# Patient Record
Sex: FEMALE | Race: WHITE | Employment: FULL TIME | URBAN - METROPOLITAN AREA
[De-identification: names, ages, dates, MRNs, and addresses within clinical notes are randomized per-mention and may not be internally consistent; named-entity substitution may affect disease eponyms.]

---

## 2021-07-14 ENCOUNTER — APPOINTMENT (OUTPATIENT)
Dept: CT IMAGING | Age: 21
End: 2021-07-14
Payer: COMMERCIAL

## 2021-07-14 ENCOUNTER — HOSPITAL ENCOUNTER (EMERGENCY)
Age: 21
Discharge: HOME | End: 2021-07-15
Attending: EMERGENCY MEDICINE
Payer: COMMERCIAL

## 2021-07-14 DIAGNOSIS — R51.9 ACUTE NONINTRACTABLE HEADACHE, UNSPECIFIED HEADACHE TYPE: Primary | ICD-10-CM

## 2021-07-14 LAB
A/G RATIO: 1.4 (ref 1.1–2.2)
ALBUMIN SERPL-MCNC: 4 G/DL (ref 3.4–5)
ALP BLD-CCNC: 47 U/L (ref 40–129)
ALT SERPL-CCNC: 8 U/L (ref 10–40)
ANION GAP SERPL CALCULATED.3IONS-SCNC: 12 MMOL/L (ref 3–16)
APPEARANCE CSF: CLEAR
APPEARANCE CSF: CLEAR
AST SERPL-CCNC: 18 U/L (ref 15–37)
BASOPHILS ABSOLUTE: 0.1 K/UL (ref 0–0.2)
BASOPHILS RELATIVE PERCENT: 0.7 %
BILIRUB SERPL-MCNC: <0.2 MG/DL (ref 0–1)
BUN BLDV-MCNC: 13 MG/DL (ref 7–20)
CALCIUM SERPL-MCNC: 9.1 MG/DL (ref 8.3–10.6)
CHLORIDE BLD-SCNC: 106 MMOL/L (ref 99–110)
CLOT EVALUATION CSF: ABNORMAL
CLOT EVALUATION CSF: NORMAL
CO2: 20 MMOL/L (ref 21–32)
COLOR CSF: COLORLESS
COLOR CSF: COLORLESS
CREAT SERPL-MCNC: 0.8 MG/DL (ref 0.6–1.1)
EOSINOPHILS ABSOLUTE: 0.1 K/UL (ref 0–0.6)
EOSINOPHILS RELATIVE PERCENT: 1.7 %
GFR AFRICAN AMERICAN: >60
GFR NON-AFRICAN AMERICAN: >60
GLOBULIN: 2.8 G/DL
GLUCOSE BLD-MCNC: 117 MG/DL (ref 70–99)
GLUCOSE, CSF: 68 MG/DL (ref 40–80)
HCT VFR BLD CALC: 33.6 % (ref 36–48)
HEMOGLOBIN: 11.3 G/DL (ref 12–16)
LYMPHOCYTES ABSOLUTE: 1.3 K/UL (ref 1–5.1)
LYMPHOCYTES RELATIVE PERCENT: 15.4 %
MCH RBC QN AUTO: 28.6 PG (ref 26–34)
MCHC RBC AUTO-ENTMCNC: 33.8 G/DL (ref 31–36)
MCV RBC AUTO: 84.8 FL (ref 80–100)
MONOCYTES ABSOLUTE: 0.8 K/UL (ref 0–1.3)
MONOCYTES RELATIVE PERCENT: 9.2 %
NEUTROPHILS ABSOLUTE: 6 K/UL (ref 1.7–7.7)
NEUTROPHILS RELATIVE PERCENT: 73 %
NO DIFFERENTIAL CSF: ABNORMAL
NO DIFFERENTIAL CSF: NORMAL
PDW BLD-RTO: 15.4 % (ref 12.4–15.4)
PLATELET # BLD: 278 K/UL (ref 135–450)
PMV BLD AUTO: 8.5 FL (ref 5–10.5)
POTASSIUM REFLEX MAGNESIUM: 4.3 MMOL/L (ref 3.5–5.1)
PROTEIN CSF: 23 MG/DL (ref 15–45)
RBC # BLD: 3.96 M/UL (ref 4–5.2)
RBC CSF: 0 /CUMM
RBC CSF: 3 /CUMM
S PYO AG THROAT QL: NEGATIVE
SODIUM BLD-SCNC: 138 MMOL/L (ref 136–145)
TOTAL PROTEIN: 6.8 G/DL (ref 6.4–8.2)
TUBE NUMBER CSF: ABNORMAL
TUBE NUMBER CSF: NORMAL
WBC # BLD: 8.2 K/UL (ref 4–11)
WBC CSF: 0 /CUMM (ref 0–5)
WBC CSF: 0 /CUMM (ref 0–5)

## 2021-07-14 PROCEDURE — 89050 BODY FLUID CELL COUNT: CPT

## 2021-07-14 PROCEDURE — 87205 SMEAR GRAM STAIN: CPT

## 2021-07-14 PROCEDURE — 87081 CULTURE SCREEN ONLY: CPT

## 2021-07-14 PROCEDURE — 88112 CYTOPATH CELL ENHANCE TECH: CPT

## 2021-07-14 PROCEDURE — 70450 CT HEAD/BRAIN W/O DYE: CPT

## 2021-07-14 PROCEDURE — 80053 COMPREHEN METABOLIC PANEL: CPT

## 2021-07-14 PROCEDURE — 2580000003 HC RX 258: Performed by: EMERGENCY MEDICINE

## 2021-07-14 PROCEDURE — 96375 TX/PRO/DX INJ NEW DRUG ADDON: CPT

## 2021-07-14 PROCEDURE — 82945 GLUCOSE OTHER FLUID: CPT

## 2021-07-14 PROCEDURE — 62270 DX LMBR SPI PNXR: CPT

## 2021-07-14 PROCEDURE — 87070 CULTURE OTHR SPECIMN AEROBIC: CPT

## 2021-07-14 PROCEDURE — 6360000002 HC RX W HCPCS: Performed by: EMERGENCY MEDICINE

## 2021-07-14 PROCEDURE — 85025 COMPLETE CBC W/AUTO DIFF WBC: CPT

## 2021-07-14 PROCEDURE — 87529 HSV DNA AMP PROBE: CPT

## 2021-07-14 PROCEDURE — 99284 EMERGENCY DEPT VISIT MOD MDM: CPT

## 2021-07-14 PROCEDURE — 84157 ASSAY OF PROTEIN OTHER: CPT

## 2021-07-14 PROCEDURE — 87880 STREP A ASSAY W/OPTIC: CPT

## 2021-07-14 PROCEDURE — 96365 THER/PROPH/DIAG IV INF INIT: CPT

## 2021-07-14 RX ORDER — KETOROLAC TROMETHAMINE 30 MG/ML
30 INJECTION, SOLUTION INTRAMUSCULAR; INTRAVENOUS ONCE
Status: COMPLETED | OUTPATIENT
Start: 2021-07-14 | End: 2021-07-14

## 2021-07-14 RX ORDER — SODIUM CHLORIDE 0.9 % (FLUSH) 0.9 %
5-40 SYRINGE (ML) INJECTION PRN
Status: DISCONTINUED | OUTPATIENT
Start: 2021-07-14 | End: 2021-07-15 | Stop reason: HOSPADM

## 2021-07-14 RX ORDER — SODIUM CHLORIDE 0.9 % (FLUSH) 0.9 %
5-40 SYRINGE (ML) INJECTION EVERY 12 HOURS SCHEDULED
Status: DISCONTINUED | OUTPATIENT
Start: 2021-07-14 | End: 2021-07-15 | Stop reason: HOSPADM

## 2021-07-14 RX ORDER — SODIUM CHLORIDE 9 MG/ML
25 INJECTION, SOLUTION INTRAVENOUS PRN
Status: DISCONTINUED | OUTPATIENT
Start: 2021-07-14 | End: 2021-07-15 | Stop reason: HOSPADM

## 2021-07-14 RX ADMIN — KETOROLAC TROMETHAMINE 30 MG: 30 INJECTION, SOLUTION INTRAMUSCULAR; INTRAVENOUS at 22:11

## 2021-07-14 RX ADMIN — CEFTRIAXONE SODIUM 1000 MG: 1 INJECTION, POWDER, FOR SOLUTION INTRAMUSCULAR; INTRAVENOUS at 23:09

## 2021-07-14 ASSESSMENT — ENCOUNTER SYMPTOMS
NAUSEA: 1
SORE THROAT: 1
PHOTOPHOBIA: 0
COUGH: 0

## 2021-07-14 ASSESSMENT — PAIN SCALES - GENERAL
PAINLEVEL_OUTOF10: 7
PAINLEVEL_OUTOF10: 3
PAINLEVEL_OUTOF10: 9

## 2021-07-15 VITALS
HEIGHT: 67 IN | BODY MASS INDEX: 22.76 KG/M2 | TEMPERATURE: 98.7 F | DIASTOLIC BLOOD PRESSURE: 57 MMHG | RESPIRATION RATE: 18 BRPM | OXYGEN SATURATION: 96 % | SYSTOLIC BLOOD PRESSURE: 110 MMHG | HEART RATE: 72 BPM | WEIGHT: 145 LBS

## 2021-07-15 NOTE — ED NOTES
Dr. Kaitlin Andrews at bedside informing patient and mother about lumbar puncture. Consent signed at this time.       Joshua Jim RN  07/14/21 8822

## 2021-07-15 NOTE — ED NOTES
Dr. Devorah Mcarthur at bedside assessing pt and discussing plan of care     Barbara Morejon RN  07/14/21 0212

## 2021-07-15 NOTE — ED PROVIDER NOTES
[address] [facility]  ED  EMERGENCY DEPARTMENT ENCOUNTER      Pt Name: Keon Hamm  MRN: [id number]  [place] 2000  Date of evaluation: 7/14/2021  Provider: Jim Hernandez MD    CHIEF COMPLAINT       Chief Complaint   Patient presents with    Headache     starting around 31 75 62 tonight with nausea and neck pain. denies any vision changes         HISTORY OF PRESENT ILLNESS   (Location/Symptom, Timing/Onset,Context/Setting, Quality, Duration, Modifying Factors, Severity)  Note limiting factors. Keon Hamm is a 24 y.o. female who presents to the emergency department for headache and neck stiffness and neck pain. The patient states that over the past 3 days she has had mild headache. She has tried taking ibuprofen for this. Tonight however headache suddenly worsened and radiated to her neck. She is concerned for meningitis as the patient has a history of meningitis. 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 Ceftriaxone is recommended promptly if meningitis is ever suspected in this patient. She has no neurologic deficits. She has not had a fever. No recent illnesses. Of note her significant has been working with the homeless population. 3 days,  Progressively worsening    Nursing notes were reviewed. REVIEW OF SYSTEMS    (2-9 systems for level 4, 10 or more for level 5)     Review of Systems   Constitutional: Negative for fatigue and fever. HENT: Positive for sore throat. Eyes: Negative for photophobia and visual disturbance. Respiratory: Negative for cough. Cardiovascular: Negative for chest pain. Gastrointestinal: Positive for nausea. Endocrine: Negative for polyuria. Genitourinary: Negative for dysuria. Musculoskeletal: Positive for myalgias, neck pain and neck stiffness. Skin: Negative for rash. Neurological: Positive for headaches. Negative for speech difficulty, weakness and numbness. Hematological: Bruises/bleeds easily. Psychiatric/Behavioral: Negative for decreased concentration. PAST MEDICAL HISTORY   History reviewed. No pertinent past medical history. SURGICALHISTORY     History reviewed. No pertinent surgical history. CURRENT MEDICATIONS       There are no discharge medications for this patient. ALLERGIES     Patient has no known allergies. FAMILY HISTORY     History reviewed. No pertinent family history. SOCIAL HISTORY       Social History     Socioeconomic History    Marital status: Single     Spouse name: None    Number of children: None    Years of education: None    Highest education level: None   Occupational History    None   Tobacco Use    Smoking status: Never Smoker    Smokeless tobacco: Never Used   Substance and Sexual Activity    Alcohol use: Yes     Comment: occ    Drug use: Never    Sexual activity: None   Other Topics Concern    None   Social History Narrative    None     Social Determinants of Health     Financial Resource Strain:     Difficulty of Paying Living Expenses:    Food Insecurity:     Worried About Running Out of Food in the Last Year:     Ran Out of Food in the Last Year:    Transportation Needs:     Lack of Transportation (Medical):      Lack of Transportation (Non-Medical):    Physical Activity:     Days of Exercise per Week:     Minutes of Exercise per Session:    Stress:     Feeling of Stress :    Social Connections:     Frequency of Communication with Friends and Family:     Frequency of Social Gatherings with Friends and Family:     Attends Jainism Services:     Active Member of Clubs or Organizations:     Attends Club or Organization Meetings:     Marital Status:    Intimate Partner Violence:     Fear of Current or Ex-Partner:     Emotionally Abused:     Physically Abused:     Sexually Abused: SCREENINGS             PHYSICAL EXAM    (up to 7 for level 4, 8 or more for level 5)     ED Triage Vitals [07/14/21 2025]   BP Temp Temp Source Pulse Resp SpO2 Height Weight   130/71 98.7 °F (37.1 °C) Oral 97 18 94 % 5' 7\" (1.702 m) 145 lb (65.8 kg)       Physical Exam  Vitals and nursing note reviewed. Constitutional:       Appearance: Normal appearance. She is well-developed. She is not ill-appearing. HENT:      Head: Normocephalic and atraumatic. Right Ear: External ear normal.      Left Ear: External ear normal.      Nose: Nose normal.      Mouth/Throat:      Pharynx: Posterior oropharyngeal erythema present. Eyes:      General: No scleral icterus. Right eye: No discharge. Left eye: No discharge. Conjunctiva/sclera: Conjunctivae normal.   Cardiovascular:      Rate and Rhythm: Normal rate and regular rhythm. Heart sounds: Normal heart sounds. Pulmonary:      Effort: Pulmonary effort is normal. No respiratory distress. Breath sounds: Normal breath sounds. No wheezing or rales. Abdominal:      General: Bowel sounds are normal. There is no distension. Palpations: Abdomen is soft. Tenderness: There is no abdominal tenderness. There is no guarding or rebound. Musculoskeletal:      Cervical back: Rigidity present. Lymphadenopathy:      Cervical: No cervical adenopathy. Skin:     Coloration: Skin is not pale. Findings: No rash. Neurological:      Mental Status: She is alert.    Psychiatric:         Mood and Affect: Mood normal.         Behavior: Behavior normal.         DIAGNOSTIC RESULTS     EKG: All EKG's are interpreted by the Emergency Department Physician who either signs or Co-signs this chart in the absence of a cardiologist.    12 lead EKG shows     RADIOLOGY:   Non-plain film images such as CT, Ultrasound and MRI are read by the radiologist. Plain radiographic images are visualized and preliminarily interpreted by the emergency physician with the below findings:        Interpretation per the Radiologist below, if available at the time of this note:    CT Head WO Contrast   Final Result   No acute intracranial abnormality.                ED BEDSIDE ULTRASOUND:   Performed by ED Physician - none    LABS:  Labs Reviewed   CBC WITH AUTO DIFFERENTIAL - Abnormal; Notable for the following components:       Result Value    RBC 3.96 (*)     Hemoglobin 11.3 (*)     Hematocrit 33.6 (*)     All other components within normal limits    Narrative:     Performed at:  [address], [facility] [employer]   Phone [phone]   COMPREHENSIVE METABOLIC PANEL W/ REFLEX TO MG FOR LOW K - Abnormal; Notable for the following components:    CO2 20 (*)     Glucose 117 (*)     ALT 8 (*)     All other components within normal limits    Narrative:     Performed at:  [address], [facility] [employer]   Phone [phone]   CSF CELL COUNT WITH DIFFERENTIAL - Abnormal; Notable for the following components:    RBC, CSF 3 (*)     All other components within normal limits    Narrative:     Performed at:  [address], [facility] [employer]   Phone [phone] A THROAT    Narrative:     Performed at:  [address], [facility] [employer]   Phone [phone]   HSV PCR    Narrative:     Referred out by:  [address], [facility] [employer]   Phone [phone]   CULTURE, BETA STREP CONFIRM PLATES   CULTURE, CSF    Narrative:     ORDER#: [id number]                          ORDERED BY: Lissa Kennedy  SOURCE: CSF (Spinal Fluid)                 COLLECTED:  07/14/21 22:30  ANTIBIOTICS AT MIKY.:                      RECEIVED :  07/14/21 22:53  Performed at:  [address] [place] [address] [address]  [address]ul Samuels  [address]   Phone [phone]   GLUCOSE, CSF    Narrative:     Performed at:  [facility] EMERGENCY [address], [facility] [employer]   Phone [phone]   PROTEIN, CSF    Narrative:     Performed at:  [facility]) [address], [facility] [employer]   Phone [phone]   CSF CELL COUNT WITH DIFFERENTIAL    Narrative:     Performed at:  [facility]) [address], [facility] [employer]   Phone [phone]   CYTOLOGY, NON-GYN       All other labs were within normal range or not returned as of this dictation. EMERGENCY DEPARTMENT COURSE and DIFFERENTIAL DIAGNOSIS/MDM:   Vitals:    Vitals:    07/14/21 2025 07/14/21 2214 07/14/21 2312 07/15/21 0020   BP: 130/71 113/63 (!) 112/58 (!) 110/57   Pulse: 97 72 64 72   Resp: 18 18 18 18   Temp: 98.7 °F (37.1 °C)      TempSrc: Oral      SpO2: 94% 99% 97% 96%   Weight: 145 lb (65.8 kg)      Height: 5' 7\" (1.702 m)          Adult female who comes in for headache with is now radiating to the neck associated with neck stiffness and a history of high risk of meningitis with previous bacterial meningitis. Laboratory studies are ordered. Rapid strep is also ordered as she does have some erythema of her tonsils. CT scan of the head is ordered to evaluate for any acute findings including signs of hemorrhage. Laboratory studies show no acute findings. CT scan negative for any acute intracranial process. Given the description of the patient's headache and her history I am concerned for subarachnoid hemorrhage or meningitis. Patient is given a dose of ceftriaxone and a lumbar puncture as discussed. Lumbar Puncture Procedure Note    Indication: Suspected meningitis    Consent: The patient was counseled regarding the procedure, it's indications, risks, potential complications and alternatives and any questions were answered.  Consent was obtained. Procedure: The patient was placed in the left lateral decubitus position and the appropriate landmarks were identified. The area was prepped and draped in the usual sterile fashion. Anesthesia was obtained using 9 of 1% Lidocaine without epinephrine. A spinal needle was inserted at the L4- L5 level with the stylet in place until spinal fluid was returned. Opening pressure was not measured. At this point 4.0 cc of clear cerebral spinal fluid was obtained and sent for appropriate testing. The stylet was then replaced and the needle was withdrawn. A sterile dressing was placed over the site and the patient was placed in the supine position. The patient tolerated the procedure well. Complications: None    Patient is given Toradol for her headache. Laboratory studies are reviewed. Although there were 3 RBCs in the initial tube in the fourth tube there were no RBCs making my suspicion for subarachnoid hemorrhage less likely. There are no white blood cells and protein and glucose are normal.  In the setting I believe that bacterial meningitis is less likely. I discussed all of her findings with the patient and her mother. Given that the patient's headache has improved with the Toradol she is afebrile has no leukocytosis there is no leukocyte in the CSF and glucose and protein are normal I believe that the patient can be discharged home. Careful return instructions are given. I recommend close follow-up in the primary care setting and she would be contacted if there is any growth in her CSF. Both the patient and the mother expressed understanding and they are agreeable with the treatment plan all of her concerns were addressed. CRITICAL CARE TIME   None       CONSULTS:  None    PROCEDURES:       Procedures    FINAL IMPRESSION      1.  Acute nonintractable headache, unspecified headache type          DISPOSITION/PLAN   DISPOSITION Decision To Discharge 07/15/2021 12:45:07 AM      PATIENT REFERREDTO:  [facility] ([facility] Pre-Services  [phone]          DISCHARGEMEDICATIONS:  There are no discharge medications for this patient.          (Please note that portions of this note were completed with a voice recognition program.  Efforts were made to edit the dictations but occasionally words are mis-transcribed.)    Dominique Thakkar MD (electronically signed)  Attending Emergency Physician        Dominique Thakkar MD  07/15/21 0194

## 2021-07-17 LAB — S PYO THROAT QL CULT: NORMAL

## 2021-07-19 LAB
HERPES SIMPLEX VIRUS BY PCR: NOT DETECTED
HSV SOURCE: NORMAL

## 2021-07-22 LAB
CSF CULTURE: NORMAL
GRAM STAIN RESULT: NORMAL

## 2023-01-10 ENCOUNTER — HOSPITAL ENCOUNTER (OUTPATIENT)
Dept: MRI IMAGING | Age: 23
Discharge: HOME | End: 2023-01-10
Payer: COMMERCIAL

## 2023-01-10 DIAGNOSIS — R10.9 ABDOMINAL PAIN, UNSPECIFIED ABDOMINAL LOCATION: ICD-10-CM

## 2023-01-10 PROCEDURE — 6360000004 HC RX CONTRAST MEDICATION: Performed by: INTERNAL MEDICINE

## 2023-01-10 PROCEDURE — 74183 MRI ABD W/O CNTR FLWD CNTR: CPT

## 2023-01-10 PROCEDURE — A9579 GAD-BASE MR CONTRAST NOS,1ML: HCPCS | Performed by: INTERNAL MEDICINE

## 2023-01-10 RX ADMIN — GADOTERIDOL 13 ML: 279.3 INJECTION, SOLUTION INTRAVENOUS at 10:09

## 2023-09-20 ENCOUNTER — HOSPITAL ENCOUNTER (OUTPATIENT)
Dept: MRI IMAGING | Age: 23
Discharge: HOME | End: 2023-09-20
Payer: COMMERCIAL

## 2023-09-20 DIAGNOSIS — K76.9 LIVER LESION: ICD-10-CM

## 2023-09-20 PROCEDURE — 74183 MRI ABD W/O CNTR FLWD CNTR: CPT

## 2023-09-20 PROCEDURE — 6360000004 HC RX CONTRAST MEDICATION: Performed by: INTERNAL MEDICINE

## 2023-09-20 PROCEDURE — A9581 GADOXETATE DISODIUM INJ: HCPCS | Performed by: INTERNAL MEDICINE

## 2023-09-20 RX ADMIN — GADOXETATE DISODIUM 5 ML: 181.43 INJECTION, SOLUTION INTRAVENOUS at 09:30
